# Patient Record
Sex: FEMALE | Race: WHITE | NOT HISPANIC OR LATINO | URBAN - NONMETROPOLITAN AREA
[De-identification: names, ages, dates, MRNs, and addresses within clinical notes are randomized per-mention and may not be internally consistent; named-entity substitution may affect disease eponyms.]

---

## 2019-06-25 ENCOUNTER — IMPORTED ENCOUNTER (OUTPATIENT)
Dept: URBAN - NONMETROPOLITAN AREA CLINIC 1 | Facility: CLINIC | Age: 44
End: 2019-06-25

## 2019-06-25 PROBLEM — H52.13: Noted: 2019-06-25

## 2019-06-25 PROBLEM — H52.223: Noted: 2019-06-25

## 2019-06-25 PROBLEM — H35.039: Noted: 2019-06-25

## 2019-06-25 PROCEDURE — 92310 CONTACT LENS FITTING OU: CPT

## 2019-06-25 PROCEDURE — 92014 COMPRE OPH EXAM EST PT 1/>: CPT

## 2019-06-25 PROCEDURE — 92015 DETERMINE REFRACTIVE STATE: CPT

## 2019-06-25 NOTE — PATIENT DISCUSSION
Myopia-Discussed diagnosis with patient. -Explained that people who are myopic are at a higher risk for developing RD/RT and reviewed associated S&S.-Pt to contact our office if symptoms develop. CL wear-CLs fit and center well.-Stressed that patient should not sleep in CL. -Updated CL Rx given. -CL care and precautions given. Updated spec Rx given. Recommend lens that will provide comfort as well as protect safety and health of eyes.

## 2020-07-14 ENCOUNTER — IMPORTED ENCOUNTER (OUTPATIENT)
Dept: URBAN - NONMETROPOLITAN AREA CLINIC 1 | Facility: CLINIC | Age: 45
End: 2020-07-14

## 2020-07-14 PROCEDURE — 92310 CONTACT LENS FITTING OU: CPT

## 2020-07-14 PROCEDURE — 92014 COMPRE OPH EXAM EST PT 1/>: CPT

## 2020-07-14 PROCEDURE — 92015 DETERMINE REFRACTIVE STATE: CPT

## 2021-07-15 ENCOUNTER — IMPORTED ENCOUNTER (OUTPATIENT)
Dept: URBAN - NONMETROPOLITAN AREA CLINIC 1 | Facility: CLINIC | Age: 46
End: 2021-07-15

## 2021-07-15 PROCEDURE — 92015 DETERMINE REFRACTIVE STATE: CPT

## 2021-07-15 PROCEDURE — 92310 CONTACT LENS FITTING OU: CPT

## 2021-07-15 PROCEDURE — 92014 COMPRE OPH EXAM EST PT 1/>: CPT

## 2022-04-15 ASSESSMENT — TONOMETRY
OS_IOP_MMHG: 16
OD_IOP_MMHG: 17
OS_IOP_MMHG: 14
OD_IOP_MMHG: 14
OS_IOP_MMHG: 14
OD_IOP_MMHG: 14

## 2022-04-15 ASSESSMENT — VISUAL ACUITY
OD_CC: 20/25
OS_SC: 20/25
OD_SC: 20/20
OU_CC: 20/20
OU_SC: 20/20
OS_CC: 20/25
OU_CC: 20/25
OD_SC: 20/25
OS_SC: 20/20
OU_CC: 20/25
OD_SC: 20/20
OU_SC: 20/25
OS_SC: 20/30-1
OS_SC: 20/25